# Patient Record
Sex: FEMALE | Race: WHITE | NOT HISPANIC OR LATINO | Employment: UNEMPLOYED | URBAN - METROPOLITAN AREA
[De-identification: names, ages, dates, MRNs, and addresses within clinical notes are randomized per-mention and may not be internally consistent; named-entity substitution may affect disease eponyms.]

---

## 2017-02-02 ENCOUNTER — HOSPITAL ENCOUNTER (OUTPATIENT)
Dept: PAIN MEDICINE | Facility: HOSPITAL | Age: 27
Discharge: HOME | End: 2017-02-02
Attending: ANESTHESIOLOGY | Admitting: ANESTHESIOLOGY

## 2017-05-02 ENCOUNTER — HOSPITAL ENCOUNTER (OUTPATIENT)
Dept: PAIN MEDICINE | Facility: HOSPITAL | Age: 27
Discharge: HOME | End: 2017-05-02
Attending: ANESTHESIOLOGY | Admitting: ANESTHESIOLOGY

## 2017-07-24 ENCOUNTER — HOSPITAL ENCOUNTER (OUTPATIENT)
Dept: PAIN MEDICINE | Facility: HOSPITAL | Age: 27
Discharge: HOME | End: 2017-07-24
Attending: ANESTHESIOLOGY | Admitting: ANESTHESIOLOGY

## 2017-10-19 ENCOUNTER — HOSPITAL ENCOUNTER (OUTPATIENT)
Dept: PAIN MEDICINE | Facility: HOSPITAL | Age: 27
Discharge: HOME | End: 2017-10-19
Attending: ANESTHESIOLOGY | Admitting: ANESTHESIOLOGY

## 2018-01-15 ENCOUNTER — HOSPITAL ENCOUNTER (OUTPATIENT)
Dept: PAIN MEDICINE | Facility: HOSPITAL | Age: 28
Discharge: HOME | End: 2018-01-15
Attending: ANESTHESIOLOGY | Admitting: ANESTHESIOLOGY

## 2018-04-03 ENCOUNTER — HOSPITAL ENCOUNTER (OUTPATIENT)
Dept: PAIN MEDICINE | Facility: HOSPITAL | Age: 28
Discharge: HOME | End: 2018-04-03
Attending: ANESTHESIOLOGY | Admitting: ANESTHESIOLOGY

## 2018-07-17 ENCOUNTER — HOSPITAL ENCOUNTER (OUTPATIENT)
Dept: PAIN MEDICINE | Facility: HOSPITAL | Age: 28
Discharge: HOME | End: 2018-07-17
Attending: ANESTHESIOLOGY | Admitting: ANESTHESIOLOGY

## 2018-11-20 ENCOUNTER — HOSPITAL ENCOUNTER (OUTPATIENT)
Dept: PAIN MEDICINE | Facility: HOSPITAL | Age: 28
Discharge: HOME | End: 2018-11-20
Attending: ANESTHESIOLOGY | Admitting: ANESTHESIOLOGY

## 2019-02-14 ENCOUNTER — HOSPITAL ENCOUNTER (OUTPATIENT)
Dept: PAIN MEDICINE | Facility: HOSPITAL | Age: 29
Discharge: HOME | End: 2019-02-14
Attending: ANESTHESIOLOGY | Admitting: ANESTHESIOLOGY

## 2019-06-05 ENCOUNTER — CONVERSION ENCOUNTER (OUTPATIENT)
Dept: PAIN MEDICINE | Facility: CLINIC | Age: 29
End: 2019-06-05

## 2019-06-05 ENCOUNTER — HOSPITAL ENCOUNTER (OUTPATIENT)
Dept: PAIN MEDICINE | Facility: HOSPITAL | Age: 29
Discharge: HOME | End: 2019-06-05
Attending: PHYSICAL MEDICINE & REHABILITATION | Admitting: PHYSICAL MEDICINE & REHABILITATION

## 2019-06-05 VITALS
DIASTOLIC BLOOD PRESSURE: 83 MMHG | BODY MASS INDEX: 44.15 KG/M2 | HEART RATE: 94 BPM | HEIGHT: 65 IN | WEIGHT: 265 LBS | OXYGEN SATURATION: 97 % | SYSTOLIC BLOOD PRESSURE: 140 MMHG | RESPIRATION RATE: 16 BRPM

## 2019-06-06 NOTE — PROGRESS NOTES
HPI: low back  and neck pain,  Lumbar Back Pain with LLE Pain,  Neck Pain with history of LUE pain,  Lumbar Back Pain is increased with ambulation.  Patient declines Epidural therapy, no benefit with ESIs in past. Taking Oxycodone 10mg TID prn.    Referring MD: Ricardo Aaron MD  History from: patient  Age: 28 Years Old  Sex: Female  Race: White    Pain Assessment   Previous Pain Rating : 8  Current Pain Ratin  Last Dose Pain medicine oxycodone  last dose   5-21  Have your bowel habits changed since you started taking pain medication? No  Epidural History epidurals did not help      Past Medical History:     Reviewed history from 2016 and no changes required:        Neck pain        Back pain        No Drug Allergies?     Past Surgical History:     Reviewed history from 2016 and no changes required:        Tonsillectomy        Leep        C section x 2    Family History Summary:      Reviewed history Last on 2019 and no changes required:2019  Mother - Has Family History of Breast Cancer - Entered On: 2018  Father - Has Family History of Heart Disease - Entered On: 2018      Social History:     Reviewed history from 2018 and no changes required:                 2 children         Vital Signs:    Patient Profile:    28 Years Old Female  Height:     65 inches  Weight:     265 pounds  BMI:        44.09     O2 Sat:     97 %  Temp:       98.8 degrees F  Pulse rate: 94 / minute  Resp:       16 per minute  BP Sittin / 83        Problems: Active problems were reviewed with the patient during this visit.  Medications: Medications were reviewed with the patient during this visit.  Allergies: Allergies were reviewed with the patient during this visit.        Vitals Entered By: Kim Durant (2019 11:51 AM)      Risk Factors:     Smoked Tobacco Use:  Current every day smoker  Smokeless Tobacco Use:  Never     Counseled to quit/cut down:  yes  Alcohol use:   no        Review of Systems        See HPI    General       Denies fever, chills and fatigue.    Eyes       Denies vision loss - both eyes.    ENT       Denies decreased hearing and difficulty swallowing.    CV       Denies chest pain or discomfort.    Resp       Denies shortness of breath.    GI       Complains of nausea.       Denies vomiting, abdominal pain, diarrhea, constipation and stool incontinence.           Denies inability to control bladder.    MS       Complains of back pain and neck pain.       Denies joint pain and joint swelling.    Derm       Denies rash.    Neuro       Complains of headaches.       Denies numbness, weakness and dizziness.      Physical Exam   General  General appearance: well developed, well nourished, no acute distress  Communication ability: communicates by voice, normal quality  Gait and station: antalgic    Mental Status Exam   Mental Status: AAO x3  Thought Content: Intact  Behavior: Appropriate    Head and Face   Inspection: normocephalic, no scars, lesions, or masses  Facial strength: no weakness    Respiratory   Auscultation: clear to auscultation bilaterally, no rales, rhonchi, or wheezes    Cardiovascular   Auscultation: RRR, no murmur, rub, or gallop    Abdomen   Abdomen: soft, non-tender, non-distended, no masses, bowel sounds normal    Extremities   Pedal pulses: pulses 2+, symmetric  Periph. circulation: no cyanosis, clubbing, edema, or varicosities      EXAM:     Neuro   R Leg 2+  L Leg 2+    Strength: Legs Normal  Sensation: SILT in BLE      Total Score Risk Category   Low Risk 0 - 3   Moderate Risk 4 - 7   High Risk > 8     Assessment   Status of Existing Problems:  Assessed CERVICALGIA as unchanged - Dada Oneil MD  Assessed Back pain, lumbar, with radiculopathy as unchanged - Dada Oneil MD    Plan   New Prescriptions/Refills:  OXYCODONE HCL 10 MG ORAL TABLET (OXYCODONE HCL) 1 po Q8H prn pain. Dx M54.12. DNF until 7/5/19.  #90 x 0,  06/05/2019,  Dada Oneil MD  OXYCODONE HCL 10 MG ORAL TABLET (OXYCODONE HCL) 1 po Q8H prn pain. Dx M54.12.  #90 x 0,  06/05/2019, Dada Oneil MD    Updated Medication List:   OXYCODONE HCL 10 MG ORAL TABLET (OXYCODONE HCL) 1 po Q8H prn pain. Dx M54.12. DNF until 7/5/19.  LOSARTAN POTASSIUM 50 MG ORAL TABLET (LOSARTAN POTASSIUM) Take 1 tablet by mouth daily  DUEXIS 800-26.6 MG ORAL TABLET (IBUPROFEN-FAMOTIDINE) 1 po QD  NEURONTIN 600 MG ORAL TABLET (GABAPENTIN) 1 po qhs    New Orders:   Ofc Vst, Est Level III [[id number]]        Patient Instructions:  1)  Inspect reviewed, in order. UDS in order 11/2018.  2)  Cont Oxycodone 10mg TID prn, helps, no side effects.  3)  F/u with Dr. Aaron in 2 months.    ]      Electronically signed by Dada Oneil MD on 06/05/2019 at 12:50 PM  ________________________________________________________________________       Disclaimer: Converted Note message may not contain all data elements that existed in the legacy source system. Please see [email or web address] [facility] for the original note details.

## 2019-08-02 RX ORDER — OXYCODONE HYDROCHLORIDE 10 MG/1
TABLET ORAL
COMMUNITY
Start: 2018-02-08 | End: 2019-08-02 | Stop reason: SDUPTHER

## 2019-08-02 RX ORDER — OXYCODONE HYDROCHLORIDE 10 MG/1
10 TABLET ORAL EVERY 8 HOURS
Qty: 90 TABLET | Refills: 0 | Status: SHIPPED | OUTPATIENT
Start: 2019-08-02 | End: 2019-08-26 | Stop reason: SDUPTHER

## 2019-08-26 ENCOUNTER — OFFICE VISIT (OUTPATIENT)
Dept: PAIN MEDICINE | Facility: CLINIC | Age: 29
End: 2019-08-26

## 2019-08-26 ENCOUNTER — RESULTS ENCOUNTER (OUTPATIENT)
Dept: PAIN MEDICINE | Facility: CLINIC | Age: 29
End: 2019-08-26

## 2019-08-26 VITALS
HEART RATE: 86 BPM | RESPIRATION RATE: 16 BRPM | TEMPERATURE: 98.7 F | HEIGHT: 65 IN | SYSTOLIC BLOOD PRESSURE: 175 MMHG | OXYGEN SATURATION: 97 % | BODY MASS INDEX: 43.32 KG/M2 | DIASTOLIC BLOOD PRESSURE: 95 MMHG | WEIGHT: 260 LBS

## 2019-08-26 DIAGNOSIS — M54.16 LUMBAR RADICULOPATHY: ICD-10-CM

## 2019-08-26 DIAGNOSIS — G89.29 CHRONIC BILATERAL LOW BACK PAIN WITH LEFT-SIDED SCIATICA: Primary | Chronic | ICD-10-CM

## 2019-08-26 DIAGNOSIS — M54.42 CHRONIC BILATERAL LOW BACK PAIN WITH LEFT-SIDED SCIATICA: Primary | Chronic | ICD-10-CM

## 2019-08-26 DIAGNOSIS — Z79.899 LONG TERM USE OF DRUG: ICD-10-CM

## 2019-08-26 DIAGNOSIS — M54.2 NECK PAIN: ICD-10-CM

## 2019-08-26 PROBLEM — M54.50 LOW BACK PAIN: Chronic | Status: ACTIVE | Noted: 2019-08-26

## 2019-08-26 PROCEDURE — 99213 OFFICE O/P EST LOW 20 MIN: CPT | Performed by: PHYSICAL MEDICINE & REHABILITATION

## 2019-08-26 PROCEDURE — G0463 HOSPITAL OUTPT CLINIC VISIT: HCPCS | Performed by: PHYSICAL MEDICINE & REHABILITATION

## 2019-08-26 RX ORDER — GABAPENTIN 600 MG/1
TABLET ORAL
COMMUNITY
Start: 2016-03-10 | End: 2020-02-19

## 2019-08-26 RX ORDER — LOSARTAN POTASSIUM 50 MG/1
50 TABLET ORAL DAILY
Refills: 5 | COMMUNITY
Start: 2019-08-07 | End: 2021-09-02 | Stop reason: ALTCHOICE

## 2019-08-26 RX ORDER — OXYCODONE HYDROCHLORIDE 10 MG/1
10 TABLET ORAL EVERY 8 HOURS
Qty: 90 TABLET | Refills: 0 | Status: SHIPPED | OUTPATIENT
Start: 2019-08-26 | End: 2019-08-26 | Stop reason: SDUPTHER

## 2019-08-26 RX ORDER — OXYCODONE HYDROCHLORIDE 10 MG/1
10 TABLET ORAL EVERY 8 HOURS
Qty: 90 TABLET | Refills: 0 | Status: SHIPPED | OUTPATIENT
Start: 2019-08-26 | End: 2019-09-25

## 2019-08-26 NOTE — PROGRESS NOTES
Chief Complaint   Patient presents with   • Back Pain     oxycodone  last dose  0400 this am   • Neck Pain         Subjective HPI  Judy Cesar is a 29 y.o. female  who presents to the office for follow-up. Patient is new to me.She has Low back pain and neck pain. Lumbar back pain radiates to the LLE down to the foot. She rates her pain at 7/10 today. Pain descriptors include aching, stabbing, sharp, exhausting, nagging, unbearable, shooting, burning, miserable, radiating and deep. Medication, rest, hot/cold compress and TENS unit provide some relief. Walking, sitting, lying down, twisting and lifting make the pain worse. She denies bowel or bladder incontinence. Last UDS 11/2018.    PEG Assessment   What number best describes your pain on average in the past week?6  What number best describes how, during the past week, pain has interfered with your enjoyment of life?8  What number best describes how, during the past week, pain has interfered with your general activity?  7      The following portions of the patient's history were reviewed and updated as appropriate: allergies, current medications, past family history, past medical history, past social history, past surgical history and problem list.    Review of Systems   Constitutional: Negative for appetite change.   Respiratory: Negative for chest tightness and shortness of breath.    Cardiovascular: Negative for chest pain.   Gastrointestinal: Positive for nausea. Negative for abdominal pain, constipation, diarrhea and vomiting.   Genitourinary: Negative for difficulty urinating.   Musculoskeletal: Positive for back pain and neck pain.   Neurological: Negative for dizziness, weakness, numbness and headaches.   Psychiatric/Behavioral: Positive for sleep disturbance. Negative for behavioral problems and decreased concentration.       Vitals:    08/26/19 1105   BP: 175/95   Pulse: 86   Resp: 16   Temp: 98.7 °F (37.1 °C)   SpO2: 97%       Objective   Physical Exam    Constitutional: She is oriented to person, place, and time. She appears well-developed and well-nourished.   HENT:   Head: Normocephalic and atraumatic.   Eyes: EOM are normal.   PER   Neck:   Neck TTP C7. Full ROM with increased pain.   Cardiovascular: Normal rate, regular rhythm and normal heart sounds.   No murmur heard.  Pulmonary/Chest: Effort normal and breath sounds normal. No respiratory distress. She has no wheezes.   Abdominal: Soft. Bowel sounds are normal. There is no tenderness.   Musculoskeletal:   Calves S/NT. No edema BLE.  MUNSON.  Low back TTP S1. Decreased ROM with flexion, extension and S to S bending.   Neurological: She is alert and oriented to person, place, and time. She displays no atrophy. No sensory deficit. She exhibits normal muscle tone.   Reflex Scores:       Tricep reflexes are 1+ on the right side and 1+ on the left side.       Bicep reflexes are 1+ on the right side and 1+ on the left side.       Brachioradialis reflexes are 1+ on the right side and 1+ on the left side.       Patellar reflexes are 2+ on the right side and 2+ on the left side.       Achilles reflexes are 2+ on the right side and 2+ on the left side.  Skin: Skin is warm and dry.   Psychiatric: She has a normal mood and affect. Her behavior is normal.   Vitals reviewed.          Assessment/Plan   Judy was seen today for back pain and neck pain.    Diagnoses and all orders for this visit:    Chronic bilateral low back pain with left-sided sciatica    Long term use of drug  -     Urine Drug Screen - Urine, Clean Catch; Future    Lumbar radiculopathy    Neck pain    Other orders  -     Discontinue: oxyCODONE (ROXICODONE) 10 MG tablet; Take 1 tablet by mouth Every 8 (Eight) Hours for 30 days.  -     oxyCODONE (ROXICODONE) 10 MG tablet; Take 1 tablet by mouth Every 8 (Eight) Hours for 30 days.    UDS  Continue oxycodone  Continue OTC ibuprofen  Continue Gabapentin    Return in about 2 months (around 10/26/2019).          INSPECT REPORT    As part of the patient's treatment plan, I am prescribing controlled substances. The patient has been made aware of appropriate use of such medications, including potential risk of somnolence, limited ability to drive and/or work safely, and the potential for dependence or overdose. It has also bee made clear that these medications are for use by this patient only, without concomitant use of alcohol or other substances unless prescribed.     Patient has completed prescribing agreement detailing terms of continued prescribing of controlled substances, including monitoring INSPECT reports, urine drug screening, and pill counts if necessary. The patient is aware that inappropriate use will results in cessation of prescribing such medications.    INSPECT report has been reviewed and scanned into the patient's chart.    As the clinician, I personally reviewed the INSPECT from 8/26/2019 while the patient was in the office today.    History and physical exam exhibit continued safe and appropriate use of controlled substances.

## 2019-11-06 RX ORDER — OXYCODONE HYDROCHLORIDE 10 MG/1
10 TABLET ORAL EVERY 8 HOURS PRN
Qty: 90 TABLET | Refills: 0 | Status: SHIPPED | OUTPATIENT
Start: 2019-11-06 | End: 2019-11-18 | Stop reason: SDUPTHER

## 2019-11-18 ENCOUNTER — OFFICE VISIT (OUTPATIENT)
Dept: PAIN MEDICINE | Facility: CLINIC | Age: 29
End: 2019-11-18

## 2019-11-18 VITALS
BODY MASS INDEX: 44.15 KG/M2 | HEART RATE: 56 BPM | DIASTOLIC BLOOD PRESSURE: 89 MMHG | SYSTOLIC BLOOD PRESSURE: 149 MMHG | TEMPERATURE: 98.3 F | RESPIRATION RATE: 16 BRPM | WEIGHT: 265 LBS | OXYGEN SATURATION: 100 % | HEIGHT: 65 IN

## 2019-11-18 DIAGNOSIS — M54.42 CHRONIC BILATERAL LOW BACK PAIN WITH LEFT-SIDED SCIATICA: Primary | Chronic | ICD-10-CM

## 2019-11-18 DIAGNOSIS — M54.2 NECK PAIN: ICD-10-CM

## 2019-11-18 DIAGNOSIS — G89.29 CHRONIC BILATERAL LOW BACK PAIN WITH LEFT-SIDED SCIATICA: Primary | Chronic | ICD-10-CM

## 2019-11-18 DIAGNOSIS — M54.16 LUMBAR RADICULOPATHY: ICD-10-CM

## 2019-11-18 PROCEDURE — G0463 HOSPITAL OUTPT CLINIC VISIT: HCPCS | Performed by: PHYSICAL MEDICINE & REHABILITATION

## 2019-11-18 PROCEDURE — 99214 OFFICE O/P EST MOD 30 MIN: CPT | Performed by: PHYSICAL MEDICINE & REHABILITATION

## 2019-11-18 RX ORDER — OXYCODONE HYDROCHLORIDE 10 MG/1
10 TABLET ORAL EVERY 8 HOURS PRN
Qty: 90 TABLET | Refills: 0 | Status: SHIPPED | OUTPATIENT
Start: 2019-11-18 | End: 2019-11-18 | Stop reason: SDUPTHER

## 2019-11-18 RX ORDER — OXYCODONE HYDROCHLORIDE 10 MG/1
10 TABLET ORAL EVERY 8 HOURS PRN
Qty: 90 TABLET | Refills: 0 | Status: SHIPPED | OUTPATIENT
Start: 2019-11-18 | End: 2020-02-19 | Stop reason: SDUPTHER

## 2019-11-18 NOTE — PROGRESS NOTES
Chief Complaint   Patient presents with   • Back Pain     oxycodone  last dose   0600 this am   • Neck Pain   • Shoulder Pain     left         Subjective   Judy Cesar is a 29 y.o. female  who presents to the office for follow-up.She has chronic low back pain with intermittent pain RLE as well as neck pain. She rates her pain at 6/10 today. Pain desriptors include aching, stabbing, sharp, nagging, unbearable at times, shooting, burning, radiating and deep. Medication, heat, cold compress and TENS provide some temporary relief. Walking, bending, standing, sitting too long, lifting and pulling make the pain worse. MRI C-Spine, T-Spine and L-Spine reports reviewed. Denies bowel or bladder incontinence. Last UDS 8/26/2019.      MRI C-spine 11/12/2015       MRI T-Spine11/12/2015    MRI L-Spine 11/12/2015      PEG Assessment   What number best describes your pain on average in the past week?6  What number best describes how, during the past week, pain has interfered with your enjoyment of life?6  What number best describes how, during the past week, pain has interfered with your general activity?  5      The following portions of the patient's history were reviewed and updated as appropriate: allergies, current medications, past family history, past medical history, past social history, past surgical history and problem list.    Review of Systems   Constitutional: Negative for appetite change.        Tiredness   Respiratory: Negative for chest tightness and shortness of breath.    Cardiovascular: Negative for chest pain.   Gastrointestinal: Negative for abdominal pain, constipation, diarrhea, nausea and vomiting.   Genitourinary: Negative for difficulty urinating.   Musculoskeletal: Positive for back pain and neck pain.        Intermittent LLE pain   Neurological: Negative for dizziness, weakness, numbness and headaches.   Psychiatric/Behavioral: Positive for sleep disturbance.       Vitals:    11/18/19 1026   BP:  149/89   Pulse: 56   Resp: 16   Temp: 98.3 °F (36.8 °C)   SpO2: 100%       Objective   Physical Exam   Constitutional: She is oriented to person, place, and time. She appears well-developed and well-nourished. No distress.   HENT:   Head: Normocephalic and atraumatic.   Eyes: EOM are normal.   PER   Neck:   Neck TTP Left C7 and upper trapezius. Decreased AROM with flexion, extension, S to S bending and rotation.   Cardiovascular: Normal rate, regular rhythm and normal heart sounds.   Pulmonary/Chest: Effort normal and breath sounds normal. No stridor. She has no wheezes.   Abdominal: Soft. Bowel sounds are normal. There is no tenderness.   Musculoskeletal:   Back TTP L4-S1. Decreased AROM with flexion, extension and S to S bending.   Neurological: She is alert and oriented to person, place, and time. She has normal strength. She displays no atrophy. No sensory deficit.   Reflex Scores:       Tricep reflexes are 2+ on the right side and 2+ on the left side.       Bicep reflexes are 2+ on the right side and 2+ on the left side.       Brachioradialis reflexes are 2+ on the right side and 2+ on the left side.       Patellar reflexes are 2+ on the right side and 2+ on the left side.       Achilles reflexes are 2+ on the right side and 2+ on the left side.  Skin: Skin is warm and dry.   Psychiatric: She has a normal mood and affect. Her behavior is normal.   Vitals reviewed.          Assessment/Plan   Judy was seen today for back pain, neck pain and shoulder pain.    Diagnoses and all orders for this visit:    Chronic bilateral low back pain with left-sided sciatica    Lumbar radiculopathy    Neck pain    Other orders  -     Discontinue: oxyCODONE (ROXICODONE) 10 MG tablet; Take 1 tablet by mouth Every 8 (Eight) Hours As Needed for Moderate Pain .  -     Discontinue: oxyCODONE (ROXICODONE) 10 MG tablet; Take 1 tablet by mouth Every 8 (Eight) Hours As Needed for Moderate Pain  or Severe Pain .  -     oxyCODONE  (ROXICODONE) 10 MG tablet; Take 1 tablet by mouth Every 8 (Eight) Hours As Needed for Moderate Pain  or Severe Pain .    Continue oxycodone and gabapentin.  Also takes OTC ibuprofen prn.    Return in about 3 months (around 2/18/2020) for Pain Mgmt.           INSPECT REPORT    As part of the patient's treatment plan, I am prescribing controlled substances. The patient has been made aware of appropriate use of such medications, including potential risk of somnolence, limited ability to drive and/or work safely, and the potential for dependence or overdose. It has also bee made clear that these medications are for use by this patient only, without concomitant use of alcohol or other substances unless prescribed.     Patient has completed prescribing agreement detailing terms of continued prescribing of controlled substances, including monitoring INSPECT reports, urine drug screening, and pill counts if necessary. The patient is aware that inappropriate use will results in cessation of prescribing such medications.    INSPECT report has been reviewed and scanned into the patient's chart.    As the clinician, I personally reviewed the INSPECT from 11/18/2019 while the patient was in the office today.    History and physical exam exhibit continued safe and appropriate use of controlled substances.

## 2020-02-19 ENCOUNTER — OFFICE VISIT (OUTPATIENT)
Dept: PAIN MEDICINE | Facility: CLINIC | Age: 30
End: 2020-02-19

## 2020-02-19 VITALS
SYSTOLIC BLOOD PRESSURE: 153 MMHG | OXYGEN SATURATION: 99 % | HEIGHT: 65 IN | HEART RATE: 77 BPM | WEIGHT: 268 LBS | DIASTOLIC BLOOD PRESSURE: 99 MMHG | TEMPERATURE: 97.7 F | BODY MASS INDEX: 44.65 KG/M2 | RESPIRATION RATE: 16 BRPM

## 2020-02-19 DIAGNOSIS — Z79.899 OTHER LONG TERM (CURRENT) DRUG THERAPY: ICD-10-CM

## 2020-02-19 DIAGNOSIS — M54.42 CHRONIC BILATERAL LOW BACK PAIN WITH LEFT-SIDED SCIATICA: Primary | Chronic | ICD-10-CM

## 2020-02-19 DIAGNOSIS — M54.2 NECK PAIN: ICD-10-CM

## 2020-02-19 DIAGNOSIS — G89.29 CHRONIC BILATERAL LOW BACK PAIN WITH LEFT-SIDED SCIATICA: Primary | Chronic | ICD-10-CM

## 2020-02-19 DIAGNOSIS — M54.12 CERVICAL RADICULOPATHY: ICD-10-CM

## 2020-02-19 DIAGNOSIS — M54.16 LUMBAR RADICULOPATHY: ICD-10-CM

## 2020-02-19 PROCEDURE — 99213 OFFICE O/P EST LOW 20 MIN: CPT | Performed by: PHYSICAL MEDICINE & REHABILITATION

## 2020-02-19 PROCEDURE — G0463 HOSPITAL OUTPT CLINIC VISIT: HCPCS | Performed by: PHYSICAL MEDICINE & REHABILITATION

## 2020-02-19 RX ORDER — OXYCODONE HYDROCHLORIDE 10 MG/1
10 TABLET ORAL EVERY 8 HOURS PRN
Qty: 90 TABLET | Refills: 0 | Status: SHIPPED | OUTPATIENT
Start: 2020-02-19 | End: 2020-02-19 | Stop reason: SDUPTHER

## 2020-02-19 RX ORDER — OXYCODONE HYDROCHLORIDE 10 MG/1
10 TABLET ORAL EVERY 8 HOURS PRN
Qty: 90 TABLET | Refills: 0 | Status: SHIPPED | OUTPATIENT
Start: 2020-02-19 | End: 2020-04-29 | Stop reason: SDUPTHER

## 2020-02-19 NOTE — PROGRESS NOTES
Subjective   Judy Cesar is a 29 y.o. female.     chronic low back pain with intermittent pain RLE as well as neck pain. She rates her pain at 7/10 today. Pain desriptors include aching, stabbing, sharp, nagging, unbearable at times, shooting, burning, radiating and deep. Medication, heat, cold compress and TENS provide some temporary relief. Walking, bending, standing, sitting too long, lifting and pulling make the pain worse. MRI C-Spine, T-Spine and L-Spine reports reviewed, has DDD of C-spine and L-spine with left S1 nerve root impingement. Denies bowel or bladder incontinence.       The following portions of the patient's history were reviewed and updated as appropriate: allergies, current medications, past family history, past medical history, past social history, past surgical history and problem list.    Review of Systems   Constitutional: Negative for chills, fatigue and fever.   HENT: Negative for hearing loss and trouble swallowing.    Eyes: Negative for visual disturbance.   Respiratory: Negative for shortness of breath.    Cardiovascular: Negative for chest pain.   Gastrointestinal: Negative for abdominal pain, constipation, diarrhea, nausea and vomiting.   Genitourinary: Negative for urinary incontinence.   Musculoskeletal: Positive for back pain and neck pain. Negative for arthralgias, joint swelling and myalgias.   Neurological: Negative for dizziness, weakness, numbness and headache.   Psychiatric/Behavioral: Positive for sleep disturbance.       Objective   Physical Exam   Constitutional: She is oriented to person, place, and time. She appears well-developed and well-nourished.   HENT:   Head: Normocephalic and atraumatic.   Eyes: Pupils are equal, round, and reactive to light. EOM are normal.   Neck:   Decreased AROM   Cardiovascular: Normal rate, regular rhythm, normal heart sounds and intact distal pulses.   Pulmonary/Chest: Breath sounds normal.   Abdominal: Soft. Bowel sounds are normal.  She exhibits no distension. There is no tenderness.   Neurological: She is alert and oriented to person, place, and time. She has normal strength and normal reflexes. She displays normal reflexes. No sensory deficit.   Psychiatric: She has a normal mood and affect. Her behavior is normal. Thought content normal.         Assessment/Plan   Judy was seen today for back pain and neck pain.    Diagnoses and all orders for this visit:    Chronic bilateral low back pain with left-sided sciatica  -     Discontinue: oxyCODONE (ROXICODONE) 10 MG tablet; Take 1 tablet by mouth Every 8 (Eight) Hours As Needed for Moderate Pain  or Severe Pain .  -     oxyCODONE (ROXICODONE) 10 MG tablet; Take 1 tablet by mouth Every 8 (Eight) Hours As Needed for Moderate Pain  or Severe Pain .    Lumbar radiculopathy    Neck pain    Other long term (current) drug therapy    Cervical radiculopathy        Inspect reviewed, in order. Last UDS 8/26/2019.  Continue oxycodone 10mg q8h prn, denies side effects.  Could not tolerate Gabapentin with severe somnolence.  Also takes OTC ibuprofen prn.  RTC 3 months for f/u.

## 2020-04-29 ENCOUNTER — OFFICE VISIT (OUTPATIENT)
Dept: PAIN MEDICINE | Facility: CLINIC | Age: 30
End: 2020-04-29

## 2020-04-29 VITALS — HEIGHT: 65 IN | WEIGHT: 260 LBS | BODY MASS INDEX: 43.32 KG/M2

## 2020-04-29 DIAGNOSIS — M54.16 LUMBAR RADICULOPATHY: ICD-10-CM

## 2020-04-29 DIAGNOSIS — M54.2 NECK PAIN: ICD-10-CM

## 2020-04-29 DIAGNOSIS — M54.42 CHRONIC BILATERAL LOW BACK PAIN WITH LEFT-SIDED SCIATICA: Chronic | ICD-10-CM

## 2020-04-29 DIAGNOSIS — Z79.899 OTHER LONG TERM (CURRENT) DRUG THERAPY: ICD-10-CM

## 2020-04-29 DIAGNOSIS — G89.29 CHRONIC BILATERAL LOW BACK PAIN WITH LEFT-SIDED SCIATICA: Chronic | ICD-10-CM

## 2020-04-29 DIAGNOSIS — M54.12 CERVICAL RADICULOPATHY: Primary | ICD-10-CM

## 2020-04-29 PROCEDURE — 99441 PR PHYS/QHP TELEPHONE EVALUATION 5-10 MIN: CPT | Performed by: PHYSICAL MEDICINE & REHABILITATION

## 2020-04-29 RX ORDER — OXYCODONE HYDROCHLORIDE 10 MG/1
10 TABLET ORAL EVERY 8 HOURS PRN
Qty: 90 TABLET | Refills: 0 | Status: SHIPPED | OUTPATIENT
Start: 2020-04-29 | End: 2020-06-15 | Stop reason: SDUPTHER

## 2020-04-29 NOTE — PROGRESS NOTES
Subjective   Judy Cesar is a 29 y.o. female.     chronic low back pain with intermittent pain RLE as well as neck pain. She rates her pain at 7/10 today. Pain desriptors include aching, stabbing, sharp, nagging, unbearable at times, shooting, burning, radiating and deep. Medication, heat, cold compress and TENS provide some temporary relief. Walking, bending, standing, sitting too long, lifting and pulling make the pain worse. MRI C-Spine, T-Spine and L-Spine reports reviewed, has DDD of C-spine and L-spine with left S1 nerve root impingement. Denies bowel or bladder incontinence.       The following portions of the patient's history were reviewed and updated as appropriate: allergies, current medications, past family history, past medical history, past social history, past surgical history and problem list.    Review of Systems   Constitutional: Negative for chills, fatigue and fever.   HENT: Negative for hearing loss and trouble swallowing.    Eyes: Negative for visual disturbance.   Respiratory: Negative for shortness of breath.    Cardiovascular: Negative for chest pain.   Gastrointestinal: Negative for abdominal pain, constipation, diarrhea, nausea and vomiting.   Genitourinary: Negative for urinary incontinence.   Musculoskeletal: Positive for back pain and neck pain. Negative for arthralgias, joint swelling and myalgias.   Neurological: Negative for dizziness, weakness, numbness and headache.   Psychiatric/Behavioral: Positive for sleep disturbance.       Objective   Physical Exam   Constitutional: She is oriented to person, place, and time.   Neck:   Decreased AROM   Neurological: She is alert and oriented to person, place, and time. She has normal strength and normal reflexes.   Psychiatric: She has a normal mood and affect. Her behavior is normal. Thought content normal.         Assessment/Plan   Judy was seen today for back pain and neck pain.    Diagnoses and all orders for this visit:    Cervical  radiculopathy    Chronic bilateral low back pain with left-sided sciatica    Lumbar radiculopathy    Neck pain    Other long term (current) drug therapy        Inspect reviewed, in order, filled 4/17/20. Last UDS 8/26/2019.  Continue oxycodone 10mg q8h prn, denies side effects.  Could not tolerate Gabapentin with severe somnolence.  Also takes OTC ibuprofen prn.  RTC 3 months for f/u. Telephone visit, 5 minutes discussing her plan of care and medications.

## 2020-05-07 ENCOUNTER — TELEPHONE (OUTPATIENT)
Dept: PAIN MEDICINE | Facility: CLINIC | Age: 30
End: 2020-05-07

## 2020-05-07 NOTE — TELEPHONE ENCOUNTER
Patient called and reported she is having dental surgery this morning. Wanted to make sure that if dentist gives something she put the meds she gets here aside. I called patient back and lvm that if dentist gives something she can put aside the meds we give her until she is finished taking post op meds from dentist.

## 2020-06-15 DIAGNOSIS — M54.42 CHRONIC BILATERAL LOW BACK PAIN WITH LEFT-SIDED SCIATICA: Chronic | ICD-10-CM

## 2020-06-15 DIAGNOSIS — G89.29 CHRONIC BILATERAL LOW BACK PAIN WITH LEFT-SIDED SCIATICA: Chronic | ICD-10-CM

## 2020-06-15 RX ORDER — OXYCODONE HYDROCHLORIDE 10 MG/1
10 TABLET ORAL EVERY 8 HOURS PRN
Qty: 90 TABLET | Refills: 0 | Status: SHIPPED | OUTPATIENT
Start: 2020-06-15 | End: 2020-07-13 | Stop reason: SDUPTHER

## 2020-07-13 DIAGNOSIS — M54.42 CHRONIC BILATERAL LOW BACK PAIN WITH LEFT-SIDED SCIATICA: Chronic | ICD-10-CM

## 2020-07-13 DIAGNOSIS — G89.29 CHRONIC BILATERAL LOW BACK PAIN WITH LEFT-SIDED SCIATICA: Chronic | ICD-10-CM

## 2020-07-13 RX ORDER — OXYCODONE HYDROCHLORIDE 10 MG/1
10 TABLET ORAL EVERY 8 HOURS PRN
Qty: 90 TABLET | Refills: 0 | Status: SHIPPED | OUTPATIENT
Start: 2020-07-13 | End: 2020-07-27 | Stop reason: SDUPTHER

## 2020-07-27 ENCOUNTER — RESULTS ENCOUNTER (OUTPATIENT)
Dept: PAIN MEDICINE | Facility: CLINIC | Age: 30
End: 2020-07-27

## 2020-07-27 ENCOUNTER — OFFICE VISIT (OUTPATIENT)
Dept: PAIN MEDICINE | Facility: CLINIC | Age: 30
End: 2020-07-27

## 2020-07-27 VITALS
HEART RATE: 78 BPM | RESPIRATION RATE: 16 BRPM | WEIGHT: 265 LBS | TEMPERATURE: 97.5 F | DIASTOLIC BLOOD PRESSURE: 82 MMHG | BODY MASS INDEX: 44.15 KG/M2 | HEIGHT: 65 IN | OXYGEN SATURATION: 96 % | SYSTOLIC BLOOD PRESSURE: 145 MMHG

## 2020-07-27 DIAGNOSIS — G89.29 CHRONIC BILATERAL LOW BACK PAIN WITH LEFT-SIDED SCIATICA: Primary | Chronic | ICD-10-CM

## 2020-07-27 DIAGNOSIS — M54.2 NECK PAIN: ICD-10-CM

## 2020-07-27 DIAGNOSIS — M54.42 CHRONIC BILATERAL LOW BACK PAIN WITH LEFT-SIDED SCIATICA: Primary | Chronic | ICD-10-CM

## 2020-07-27 DIAGNOSIS — Z79.899 OTHER LONG TERM (CURRENT) DRUG THERAPY: ICD-10-CM

## 2020-07-27 DIAGNOSIS — M54.16 LUMBAR RADICULOPATHY: ICD-10-CM

## 2020-07-27 DIAGNOSIS — M54.12 CERVICAL RADICULOPATHY: ICD-10-CM

## 2020-07-27 PROCEDURE — G0463 HOSPITAL OUTPT CLINIC VISIT: HCPCS | Performed by: PHYSICAL MEDICINE & REHABILITATION

## 2020-07-27 PROCEDURE — 99213 OFFICE O/P EST LOW 20 MIN: CPT | Performed by: PHYSICAL MEDICINE & REHABILITATION

## 2020-07-27 RX ORDER — PRAVASTATIN SODIUM 40 MG
40 TABLET ORAL DAILY
COMMUNITY
Start: 2020-07-12 | End: 2021-09-02

## 2020-07-27 RX ORDER — OXYCODONE HYDROCHLORIDE 10 MG/1
10 TABLET ORAL EVERY 8 HOURS PRN
Qty: 90 TABLET | Refills: 0 | Status: SHIPPED | OUTPATIENT
Start: 2020-07-27 | End: 2020-07-27 | Stop reason: SDUPTHER

## 2020-07-27 RX ORDER — OXYCODONE HYDROCHLORIDE 10 MG/1
10 TABLET ORAL EVERY 8 HOURS PRN
Qty: 90 TABLET | Refills: 0 | Status: SHIPPED | OUTPATIENT
Start: 2020-07-27 | End: 2020-10-26 | Stop reason: SDUPTHER

## 2020-07-27 NOTE — PROGRESS NOTES
Subjective   Judy Cesar is a 30 y.o. female.     chronic low back pain with intermittent pain RLE as well as neck pain. She rates her pain at 7/10 today. Pain desriptors include aching, stabbing, sharp, nagging, unbearable at times, shooting, burning, radiating and deep. Medication, heat, cold compress and TENS provide some temporary relief. Walking, bending, standing, sitting too long, lifting and pulling make the pain worse. MRI C-Spine, T-Spine and L-Spine reports reviewed, has DDD of C-spine and L-spine with left S1 nerve root impingement. Denies bowel or bladder incontinence.       The following portions of the patient's history were reviewed and updated as appropriate: allergies, current medications, past family history, past medical history, past social history, past surgical history and problem list.    Review of Systems   Constitutional: Negative for chills, fatigue and fever.   HENT: Negative for hearing loss and trouble swallowing.    Eyes: Negative for visual disturbance.   Respiratory: Negative for shortness of breath.    Cardiovascular: Negative for chest pain.   Gastrointestinal: Negative for abdominal pain, constipation, diarrhea, nausea and vomiting.   Genitourinary: Negative for urinary incontinence.   Musculoskeletal: Positive for back pain and neck pain. Negative for arthralgias, joint swelling and myalgias.   Neurological: Negative for dizziness, weakness, numbness and headache.   Psychiatric/Behavioral: Positive for sleep disturbance.       Objective   Physical Exam   Constitutional: She is oriented to person, place, and time. She appears well-developed and well-nourished.   HENT:   Head: Normocephalic and atraumatic.   Eyes: Pupils are equal, round, and reactive to light. EOM are normal.   Neck:   Decreased AROM   Cardiovascular: Normal rate, regular rhythm, normal heart sounds and intact distal pulses.   Pulmonary/Chest: Breath sounds normal.   Abdominal: Soft. Bowel sounds are normal.  She exhibits no distension. There is no tenderness.   Neurological: She is alert and oriented to person, place, and time. She has normal strength and normal reflexes. She displays normal reflexes. No sensory deficit.   Psychiatric: She has a normal mood and affect. Her behavior is normal. Thought content normal.         Assessment/Plan   Judy was seen today for back pain and neck pain.    Diagnoses and all orders for this visit:    Chronic bilateral low back pain with left-sided sciatica    Lumbar radiculopathy    Neck pain    Cervical radiculopathy    Other long term (current) drug therapy        Inspect reviewed, in order. Last UDS 8/26/2019.  Continue oxycodone 10mg q8h prn, denies side effects.  Could not tolerate Gabapentin with severe somnolence.  Also takes OTC ibuprofen prn.  RTC 3 months for f/u.

## 2020-10-26 ENCOUNTER — OFFICE VISIT (OUTPATIENT)
Dept: PAIN MEDICINE | Facility: CLINIC | Age: 30
End: 2020-10-26

## 2020-10-26 VITALS
HEART RATE: 90 BPM | DIASTOLIC BLOOD PRESSURE: 94 MMHG | WEIGHT: 256 LBS | HEIGHT: 65 IN | SYSTOLIC BLOOD PRESSURE: 156 MMHG | BODY MASS INDEX: 42.65 KG/M2 | OXYGEN SATURATION: 97 % | RESPIRATION RATE: 16 BRPM | TEMPERATURE: 97.5 F

## 2020-10-26 DIAGNOSIS — M54.42 CHRONIC BILATERAL LOW BACK PAIN WITH LEFT-SIDED SCIATICA: Primary | Chronic | ICD-10-CM

## 2020-10-26 DIAGNOSIS — M54.12 CERVICAL RADICULOPATHY: ICD-10-CM

## 2020-10-26 DIAGNOSIS — Z79.899 OTHER LONG TERM (CURRENT) DRUG THERAPY: ICD-10-CM

## 2020-10-26 DIAGNOSIS — M54.16 LUMBAR RADICULOPATHY: ICD-10-CM

## 2020-10-26 DIAGNOSIS — G89.29 CHRONIC BILATERAL LOW BACK PAIN WITH LEFT-SIDED SCIATICA: Primary | Chronic | ICD-10-CM

## 2020-10-26 DIAGNOSIS — M54.2 NECK PAIN: ICD-10-CM

## 2020-10-26 PROCEDURE — G0463 HOSPITAL OUTPT CLINIC VISIT: HCPCS | Performed by: PHYSICAL MEDICINE & REHABILITATION

## 2020-10-26 PROCEDURE — 99213 OFFICE O/P EST LOW 20 MIN: CPT | Performed by: PHYSICAL MEDICINE & REHABILITATION

## 2020-10-26 RX ORDER — OXYCODONE HYDROCHLORIDE 10 MG/1
10 TABLET ORAL EVERY 8 HOURS PRN
Qty: 90 TABLET | Refills: 0 | Status: SHIPPED | OUTPATIENT
Start: 2020-10-26 | End: 2020-10-26 | Stop reason: SDUPTHER

## 2020-10-26 RX ORDER — OXYCODONE HYDROCHLORIDE 10 MG/1
10 TABLET ORAL EVERY 8 HOURS PRN
Qty: 90 TABLET | Refills: 0 | Status: SHIPPED | OUTPATIENT
Start: 2020-10-26 | End: 2021-02-08 | Stop reason: SDUPTHER

## 2020-10-26 NOTE — PROGRESS NOTES
Subjective   Judy Cesar is a 30 y.o. female.     chronic low back pain with intermittent pain RLE as well as neck pain. She rates her pain at 7/10 today. Pain desriptors include aching, stabbing, sharp, nagging, unbearable at times, shooting, burning, radiating and deep. Medication, heat, cold compress and TENS provide some temporary relief. Walking, bending, standing, sitting too long, lifting and pulling make the pain worse. MRI C-Spine, T-Spine and L-Spine reports reviewed, has DDD of C-spine and L-spine with left S1 nerve root impingement. Denies bowel or bladder incontinence. Beginning to drop objects with L hand occasionally, has tingling when she wakes up, denies vision changes or urinary incontinence.       The following portions of the patient's history were reviewed and updated as appropriate: allergies, current medications, past family history, past medical history, past social history, past surgical history and problem list.    Review of Systems   Constitutional: Negative for chills, fatigue and fever.   HENT: Negative for hearing loss and trouble swallowing.    Eyes: Negative for visual disturbance.   Respiratory: Negative for shortness of breath.    Cardiovascular: Negative for chest pain.   Gastrointestinal: Negative for abdominal pain, constipation, diarrhea, nausea and vomiting.   Genitourinary: Negative for urinary incontinence.   Musculoskeletal: Positive for back pain and neck pain. Negative for arthralgias, joint swelling and myalgias.   Neurological: Negative for dizziness, weakness, numbness and headache.   Psychiatric/Behavioral: Positive for sleep disturbance.       Objective   Physical Exam   Constitutional: She is oriented to person, place, and time. She appears well-developed and well-nourished.   HENT:   Head: Normocephalic and atraumatic.   Eyes: Pupils are equal, round, and reactive to light.   Neck:   Decreased AROM   Cardiovascular: Normal rate, regular rhythm and normal heart  sounds.   Pulmonary/Chest: Breath sounds normal.   Abdominal: Soft. Bowel sounds are normal. She exhibits no distension. There is no abdominal tenderness.   Neurological: She is alert and oriented to person, place, and time. She has normal reflexes. She displays normal reflexes. No sensory deficit.   (-) Tinels, (+) Phalens on left   Psychiatric: Her behavior is normal. Thought content normal.         Assessment/Plan   Diagnoses and all orders for this visit:    1. Chronic bilateral low back pain with left-sided sciatica (Primary)    2. Lumbar radiculopathy    3. Neck pain    4. Cervical radiculopathy    5. Other long term (current) drug therapy        Inspect reviewed, in order. Last UDS 7/27/20.  Continue oxycodone 10mg q8h prn, denies side effects.  Could not tolerate Gabapentin with severe somnolence.  Also takes OTC ibuprofen prn.  Discussed wearing neutral wrist brace on left at night for probable early CTS.  RTC 3 months for f/u.

## 2021-02-08 ENCOUNTER — OFFICE VISIT (OUTPATIENT)
Dept: PAIN MEDICINE | Facility: CLINIC | Age: 31
End: 2021-02-08

## 2021-02-08 VITALS
BODY MASS INDEX: 42.65 KG/M2 | OXYGEN SATURATION: 96 % | WEIGHT: 256 LBS | HEART RATE: 98 BPM | HEIGHT: 65 IN | RESPIRATION RATE: 16 BRPM | SYSTOLIC BLOOD PRESSURE: 157 MMHG | TEMPERATURE: 97.5 F | DIASTOLIC BLOOD PRESSURE: 95 MMHG

## 2021-02-08 DIAGNOSIS — M54.12 CERVICAL RADICULOPATHY: ICD-10-CM

## 2021-02-08 DIAGNOSIS — M54.16 LUMBAR RADICULOPATHY: ICD-10-CM

## 2021-02-08 DIAGNOSIS — Z79.899 OTHER LONG TERM (CURRENT) DRUG THERAPY: ICD-10-CM

## 2021-02-08 DIAGNOSIS — M54.42 CHRONIC BILATERAL LOW BACK PAIN WITH LEFT-SIDED SCIATICA: Primary | Chronic | ICD-10-CM

## 2021-02-08 DIAGNOSIS — M54.2 NECK PAIN: ICD-10-CM

## 2021-02-08 DIAGNOSIS — G89.29 CHRONIC BILATERAL LOW BACK PAIN WITH LEFT-SIDED SCIATICA: Primary | Chronic | ICD-10-CM

## 2021-02-08 PROCEDURE — 99213 OFFICE O/P EST LOW 20 MIN: CPT | Performed by: PHYSICAL MEDICINE & REHABILITATION

## 2021-02-08 PROCEDURE — G0463 HOSPITAL OUTPT CLINIC VISIT: HCPCS | Performed by: PHYSICAL MEDICINE & REHABILITATION

## 2021-02-08 RX ORDER — OXYCODONE HYDROCHLORIDE 10 MG/1
10 TABLET ORAL EVERY 8 HOURS PRN
Qty: 90 TABLET | Refills: 0 | Status: SHIPPED | OUTPATIENT
Start: 2021-02-08 | End: 2021-05-03 | Stop reason: SDUPTHER

## 2021-02-08 NOTE — PROGRESS NOTES
Subjective   Judy Cesar is a 30 y.o. female.     chronic low back pain with intermittent pain RLE as well as neck pain. She rates her pain at 7/10 today. Pain desriptors include aching, stabbing, sharp, nagging, unbearable at times, shooting, burning, radiating and deep. Medication, heat, cold compress and TENS provide some temporary relief. Walking, bending, standing, sitting too long, lifting and pulling make the pain worse. MRI C-Spine, T-Spine and L-Spine reports reviewed, has DDD of C-spine and L-spine with left S1 nerve root impingement. Denies bowel or bladder incontinence. Beginning to drop objects with L hand occasionally, has tingling when she wakes up, denies vision changes or urinary incontinence.       The following portions of the patient's history were reviewed and updated as appropriate: allergies, current medications, past family history, past medical history, past social history, past surgical history and problem list.    Review of Systems   Constitutional: Negative for chills, fatigue and fever.   HENT: Negative for hearing loss and trouble swallowing.    Eyes: Negative for visual disturbance.   Respiratory: Negative for shortness of breath.    Cardiovascular: Negative for chest pain.   Gastrointestinal: Negative for abdominal pain, constipation, diarrhea, nausea and vomiting.   Genitourinary: Negative for urinary incontinence.   Musculoskeletal: Positive for back pain and neck pain. Negative for arthralgias, joint swelling and myalgias.   Neurological: Negative for dizziness, weakness, numbness and headache.   Psychiatric/Behavioral: Positive for sleep disturbance.       Objective   Physical Exam   Constitutional: She is oriented to person, place, and time. She appears well-developed and well-nourished.   HENT:   Head: Normocephalic and atraumatic.   Eyes: Pupils are equal, round, and reactive to light.   Neck:   Decreased AROM   Cardiovascular: Normal rate, regular rhythm and normal heart  sounds.   Pulmonary/Chest: Breath sounds normal.   Abdominal: Soft. Bowel sounds are normal. She exhibits no distension. There is no abdominal tenderness.   Neurological: She is alert and oriented to person, place, and time. She has normal reflexes. She displays normal reflexes. No sensory deficit.   (-) Tinels, (+) Phalens on left   Psychiatric: Her behavior is normal. Thought content normal.         Assessment/Plan   Diagnoses and all orders for this visit:    1. Chronic bilateral low back pain with left-sided sciatica (Primary)    2. Neck pain    3. Other long term (current) drug therapy    4. Lumbar radiculopathy    5. Cervical radiculopathy        Inspect reviewed, in order. Last UDS 7/27/20.  Continue oxycodone 10mg q8h prn, denies side effects.  Could not tolerate Gabapentin with severe somnolence.  Also takes OTC ibuprofen prn.  Discussed wearing neutral wrist brace on left at night for probable early CTS.  RTC 3 months for f/u.

## 2021-05-03 ENCOUNTER — OFFICE VISIT (OUTPATIENT)
Dept: PAIN MEDICINE | Facility: CLINIC | Age: 31
End: 2021-05-03

## 2021-05-03 VITALS
OXYGEN SATURATION: 97 % | HEIGHT: 65 IN | SYSTOLIC BLOOD PRESSURE: 146 MMHG | RESPIRATION RATE: 16 BRPM | DIASTOLIC BLOOD PRESSURE: 83 MMHG | WEIGHT: 269 LBS | HEART RATE: 74 BPM | TEMPERATURE: 97.3 F | BODY MASS INDEX: 44.82 KG/M2

## 2021-05-03 DIAGNOSIS — G89.29 CHRONIC BILATERAL LOW BACK PAIN WITH LEFT-SIDED SCIATICA: Primary | Chronic | ICD-10-CM

## 2021-05-03 DIAGNOSIS — M54.42 CHRONIC BILATERAL LOW BACK PAIN WITH LEFT-SIDED SCIATICA: Primary | Chronic | ICD-10-CM

## 2021-05-03 DIAGNOSIS — M54.16 LUMBAR RADICULOPATHY: ICD-10-CM

## 2021-05-03 DIAGNOSIS — M54.12 CERVICAL RADICULOPATHY: ICD-10-CM

## 2021-05-03 DIAGNOSIS — M54.2 NECK PAIN: ICD-10-CM

## 2021-05-03 DIAGNOSIS — Z79.899 OTHER LONG TERM (CURRENT) DRUG THERAPY: ICD-10-CM

## 2021-05-03 PROCEDURE — 99213 OFFICE O/P EST LOW 20 MIN: CPT | Performed by: PHYSICAL MEDICINE & REHABILITATION

## 2021-05-03 PROCEDURE — G0463 HOSPITAL OUTPT CLINIC VISIT: HCPCS | Performed by: PHYSICAL MEDICINE & REHABILITATION

## 2021-05-03 RX ORDER — OXYCODONE HYDROCHLORIDE 10 MG/1
10 TABLET ORAL EVERY 8 HOURS PRN
Qty: 90 TABLET | Refills: 0 | Status: SHIPPED | OUTPATIENT
Start: 2021-05-03 | End: 2021-09-02

## 2021-05-03 NOTE — PROGRESS NOTES
Subjective   Judy Cesar is a 30 y.o. female.     chronic low back pain with intermittent pain RLE as well as neck pain. She rates her pain at 7/10 today. Pain desriptors include aching, stabbing, sharp, nagging, unbearable at times, shooting, burning, radiating and deep. Medication, heat, cold compress and TENS provide some temporary relief. Walking, bending, standing, sitting too long, lifting and pulling make the pain worse. MRI C-Spine, T-Spine and L-Spine reports reviewed, has DDD of C-spine and L-spine with left S1 nerve root impingement. Denies bowel or bladder incontinence. Beginning to drop objects with L hand occasionally, has tingling when she wakes up, denies vision changes or urinary incontinence.       The following portions of the patient's history were reviewed and updated as appropriate: allergies, current medications, past family history, past medical history, past social history, past surgical history and problem list.    Review of Systems   Constitutional: Negative for chills, fatigue and fever.   HENT: Negative for hearing loss and trouble swallowing.    Eyes: Negative for visual disturbance.   Respiratory: Negative for shortness of breath.    Cardiovascular: Negative for chest pain.   Gastrointestinal: Negative for abdominal pain, constipation, diarrhea, nausea and vomiting.   Genitourinary: Negative for urinary incontinence.   Musculoskeletal: Positive for back pain and neck pain. Negative for arthralgias, joint swelling and myalgias.   Neurological: Negative for dizziness, weakness, numbness and headache.   Psychiatric/Behavioral: Positive for sleep disturbance.       Objective   Physical Exam   Constitutional: She is oriented to person, place, and time. She appears well-developed and well-nourished.   HENT:   Head: Normocephalic and atraumatic.   Eyes: Pupils are equal, round, and reactive to light.   Neck:   Decreased AROM   Cardiovascular: Normal rate, regular rhythm and normal heart  sounds.   Pulmonary/Chest: Breath sounds normal.   Abdominal: Soft. Bowel sounds are normal. She exhibits no distension. There is no abdominal tenderness.   Neurological: She is alert and oriented to person, place, and time. She has normal reflexes. She displays normal reflexes. No sensory deficit.   (-) Tinels, (+) Phalens on left   Psychiatric: Her behavior is normal. Thought content normal.         Assessment/Plan   Diagnoses and all orders for this visit:    1. Chronic bilateral low back pain with left-sided sciatica (Primary)    2. Neck pain    3. Cervical radiculopathy    4. Lumbar radiculopathy    5. Other long term (current) drug therapy        Inspect reviewed, in order. Last UDS 7/27/20.  Continue oxycodone 10mg q8h prn, denies side effects.  Could not tolerate Gabapentin with severe somnolence.  Also takes OTC ibuprofen prn.  Discussed wearing neutral wrist brace on left at night for probable early CTS.  RTC 3 months for f/u.

## 2021-05-10 ENCOUNTER — TELEPHONE (OUTPATIENT)
Dept: PAIN MEDICINE | Facility: HOSPITAL | Age: 31
End: 2021-05-10

## 2021-05-10 NOTE — TELEPHONE ENCOUNTER
That is a serious violation of her opioid agreement, we will need to discharge her from the clinic. Thanks.

## 2021-09-02 ENCOUNTER — OFFICE VISIT (OUTPATIENT)
Dept: CARDIOLOGY | Facility: CLINIC | Age: 31
End: 2021-09-02

## 2021-09-02 VITALS
DIASTOLIC BLOOD PRESSURE: 85 MMHG | SYSTOLIC BLOOD PRESSURE: 130 MMHG | HEIGHT: 65 IN | OXYGEN SATURATION: 98 % | WEIGHT: 273 LBS | HEART RATE: 97 BPM | BODY MASS INDEX: 45.48 KG/M2

## 2021-09-02 DIAGNOSIS — Z34.90 PREGNANCY, UNSPECIFIED GESTATIONAL AGE: ICD-10-CM

## 2021-09-02 DIAGNOSIS — I10 ESSENTIAL HYPERTENSION: Primary | ICD-10-CM

## 2021-09-02 PROCEDURE — 93000 ELECTROCARDIOGRAM COMPLETE: CPT | Performed by: INTERNAL MEDICINE

## 2021-09-02 PROCEDURE — 99204 OFFICE O/P NEW MOD 45 MIN: CPT | Performed by: INTERNAL MEDICINE

## 2021-09-02 RX ORDER — LEVOTHYROXINE SODIUM 0.03 MG/1
25 TABLET ORAL DAILY
COMMUNITY

## 2021-09-02 RX ORDER — ONDANSETRON 4 MG/1
4 TABLET, FILM COATED ORAL EVERY 8 HOURS PRN
COMMUNITY

## 2021-09-02 RX ORDER — DOXYLAMINE SUCCINATE AND PYRIDOXINE HYDROCHLORIDE, DELAYED RELEASE TABLETS 10 MG/10 MG 10; 10 MG/1; MG/1
2 TABLET, DELAYED RELEASE ORAL NIGHTLY PRN
COMMUNITY

## 2021-09-02 RX ORDER — LEVOFLOXACIN 250 MG/1
250 TABLET ORAL DAILY
COMMUNITY

## 2021-09-02 RX ORDER — ASPIRIN 81 MG/1
81 TABLET ORAL DAILY
COMMUNITY

## 2021-09-02 RX ORDER — LABETALOL 200 MG/1
200 TABLET, FILM COATED ORAL 2 TIMES DAILY
COMMUNITY

## 2021-09-02 NOTE — PROGRESS NOTES
Encounter Date:2021      Patient ID: Judy Cesar is a 31 y.o. female.    Chief Complaint:  Pregnancy  Hypertension      History of Present Illness  The patient is a pleasant 31-year-old white female who is here for evaluation of hypertension during pregnancy.  This is her third pregnancy and she is 18 weeks pregnant expected date of delivery 2021.  Patient has known history of hypertension and has been on losartan and has been switched over to labetalol 100 mg a day since she was pregnant.  Patient is asymptomatic.  Patient had preeclampsia with second pregnancy.    The patient has been without any chest discomfort ,shortness of breath, palpitations, dizziness or syncope.  Denies having any headache ,abdominal pain ,nausea, vomiting , diarrhea constipation, loss of weight or loss of appetite.  Denies having any excessive bruising ,hematuria or blood in the stool.    Review of all systems negative except as indicated.    Reviewed ROS.    Assessment and Plan     ]]]]]]]]]]]]]]]]]]]]  Impression  ===============  -Pregnancy-18 weeks-expected date of delivery 2021.  This is her third pregnancy.  Patient had preeclampsia with second pregnancy    -Hypertension hypothyroidism    -Status post  LEEP tonsillectomy    -Family history of heart disease and hypertension    -Former smoker    -No known allergies  ===========  Plan  =========  EKG showed normal sinus rhythm normal ECG.  Her blood pressure today is 130/85.  Her blood pressure log from her memory was reviewed.  Patient systolics running sometimes in the 140.  Increase labetalol 200 mg twice a day.  Medications were reviewed and updated.  Follow-up in the office in about 2 to 3 weeks with an echocardiogram.  Further plan will depend on patient's progress.  ]]]]]]]]]]]]]]]]]]           Diagnosis Plan   1. Essential hypertension  Adult Transthoracic Echo Complete W/ Cont if Necessary Per Protocol   2. Pregnancy, unspecified gestational age   Adult Transthoracic Echo Complete W/ Cont if Necessary Per Protocol   LAB RESULTS (LAST 7 DAYS)    CBC        BMP        CMP         BNP        TROPONIN        CoAg        Creatinine Clearance  CrCl cannot be calculated (No successful lab value found.).    ABG        Radiology  No radiology results for the last day                The following portions of the patient's history were reviewed and updated as appropriate: allergies, current medications, past family history, past medical history, past social history, past surgical history and problem list.    Review of Systems   Constitutional: Negative for malaise/fatigue.   Cardiovascular: Negative for chest pain, leg swelling, palpitations and syncope.   Respiratory: Negative for shortness of breath.    Skin: Negative for rash.   Gastrointestinal: Negative for nausea and vomiting.   Neurological: Negative for dizziness, light-headedness and numbness.   All other systems reviewed and are negative.        Current Outpatient Medications:   •  aspirin 81 MG EC tablet, Take 81 mg by mouth Daily., Disp: , Rfl:   •  CBD (cannabidiol) oral oil, Take  by mouth Daily., Disp: , Rfl:   •  doxylamine-pyridoxine (DICLEGIS) 10-10 MG tablet delayed-release EC tablet, Take 2 tablets by mouth At Night As Needed., Disp: , Rfl:   •  labetalol (NORMODYNE) 100 MG tablet, Take 100 mg by mouth 2 (Two) Times a Day., Disp: , Rfl:   •  levoFLOXacin (LEVAQUIN) 250 MG tablet, Take 250 mg by mouth Daily., Disp: , Rfl:   •  levothyroxine (SYNTHROID, LEVOTHROID) 25 MCG tablet, Take 25 mcg by mouth Daily., Disp: , Rfl:   •  ondansetron (ZOFRAN) 4 MG tablet, Take 4 mg by mouth Every 8 (Eight) Hours As Needed for Nausea or Vomiting., Disp: , Rfl:     No Known Allergies    Family History   Problem Relation Age of Onset   • Cancer Mother    • Heart disease Father    • Hypertension Father        Past Surgical History:   Procedure Laterality Date   •  SECTION      x2   • LEEP     • TONSILLECTOMY    "  • WISDOM TOOTH EXTRACTION         Past Medical History:   Diagnosis Date   • Back pain    • Depression    • Hip pain     right   • Low back pain    • Neck pain        Family History   Problem Relation Age of Onset   • Cancer Mother    • Heart disease Father    • Hypertension Father        Social History     Socioeconomic History   • Marital status:      Spouse name: Not on file   • Number of children: Not on file   • Years of education: Not on file   • Highest education level: Not on file   Tobacco Use   • Smoking status: Former Smoker     Packs/day: 1.00     Types: Cigarettes     Quit date: 2021     Years since quittin.4   • Smokeless tobacco: Never Used   Vaping Use   • Vaping Use: Some days   • Substances: Nicotine, Flavoring   • Devices: Pre-filled or refillable cartridge   Substance and Sexual Activity   • Alcohol use: No   • Drug use: No           ECG 12 Lead    Date/Time: 2021 2:22 PM  Performed by: Aldo Horn MD  Authorized by: Aldo Horn MD   Comparison: compared with previous ECG   Comparison to previous ECG: Normal sinus rhythm normal ECG normal axis normal intervals 92/min no ectopy no prior tracing for comparison.                Objective:       Physical Exam    /85 (BP Location: Left arm, Patient Position: Sitting, Cuff Size: Large Adult)   Pulse 97   Ht 165.1 cm (65\")   Wt 124 kg (273 lb)   SpO2 98%   BMI 45.43 kg/m²   The patient is alert, oriented and in no distress.    Vital signs as noted above.  Exogenous obesity (BMI 45)    Head and neck revealed no carotid bruits or jugular venous distension.  No thyromegaly or lymphadenopathy is present.    Lungs clear.  No wheezing.  Breath sounds are normal bilaterally.    Heart normal first and second heart sounds.  No murmur..  No pericardial rub is present.  No gallop is present.    Abdomen soft and nontender.  No organomegaly is present.    Extremities revealed good peripheral pulses without any pedal " edema.    Skin warm and dry.    Musculoskeletal system is grossly normal.    CNS grossly normal.    Reviewed and updated.

## 2021-09-03 ENCOUNTER — PATIENT ROUNDING (BHMG ONLY) (OUTPATIENT)
Dept: CARDIOLOGY | Facility: CLINIC | Age: 31
End: 2021-09-03

## 2021-09-03 NOTE — PROGRESS NOTES
September 3, 2021    Hello, may I speak with Judy Cesar?    My name is AMALIA      I am  with [facility] [place]  [address] [place] [id number].    Before we get started may I verify your date of birth? 1990    I am calling to officially welcome you to our practice and ask about your recent visit. Is this a good time to talk? yes    Tell me about your visit with us. What things went well?  THINGS WENT VERY WELL., DR ORDERED TESTING TO BE SAFE       We're always looking for ways to make our patients' experiences even better. Do you have recommendations on ways we may improve?  no    Overall were you satisfied with your first visit to our practice? yes       I appreciate you taking the time to speak with me today. Is there anything else I can do for you? no      Thank you, and have a great day.

## 2021-09-29 ENCOUNTER — OFFICE VISIT (OUTPATIENT)
Dept: CARDIOLOGY | Facility: CLINIC | Age: 31
End: 2021-09-29

## 2021-09-29 ENCOUNTER — HOSPITAL ENCOUNTER (OUTPATIENT)
Dept: CARDIOLOGY | Facility: HOSPITAL | Age: 31
Discharge: HOME | End: 2021-09-29
Admitting: INTERNAL MEDICINE

## 2021-09-29 VITALS
WEIGHT: 273 LBS | HEIGHT: 65 IN | BODY MASS INDEX: 45.48 KG/M2 | SYSTOLIC BLOOD PRESSURE: 133 MMHG | DIASTOLIC BLOOD PRESSURE: 65 MMHG

## 2021-09-29 VITALS
OXYGEN SATURATION: 98 % | SYSTOLIC BLOOD PRESSURE: 131 MMHG | DIASTOLIC BLOOD PRESSURE: 71 MMHG | HEIGHT: 65 IN | HEART RATE: 85 BPM | WEIGHT: 283 LBS | BODY MASS INDEX: 47.15 KG/M2

## 2021-09-29 DIAGNOSIS — I10 ESSENTIAL HYPERTENSION: Primary | ICD-10-CM

## 2021-09-29 DIAGNOSIS — Z34.90 PREGNANCY, UNSPECIFIED GESTATIONAL AGE: ICD-10-CM

## 2021-09-29 DIAGNOSIS — I10 ESSENTIAL HYPERTENSION: ICD-10-CM

## 2021-09-29 LAB
BH CV ECHO MEAS - AO MAX PG (FULL): 2.4 MMHG
BH CV ECHO MEAS - AO MAX PG: 8.5 MMHG
BH CV ECHO MEAS - AO MEAN PG (FULL): 1.1 MMHG
BH CV ECHO MEAS - AO MEAN PG: 4.6 MMHG
BH CV ECHO MEAS - AO ROOT AREA (BSA CORRECTED): 1.5
BH CV ECHO MEAS - AO ROOT AREA: 9.1 CM^2
BH CV ECHO MEAS - AO ROOT DIAM: 3.4 CM
BH CV ECHO MEAS - AO V2 MAX: 145.7 CM/SEC
BH CV ECHO MEAS - AO V2 MEAN: 102 CM/SEC
BH CV ECHO MEAS - AO V2 VTI: 25.9 CM
BH CV ECHO MEAS - ASC AORTA: 3.2 CM
BH CV ECHO MEAS - AVA(I,A): 3.5 CM^2
BH CV ECHO MEAS - AVA(I,D): 3.5 CM^2
BH CV ECHO MEAS - AVA(V,A): 3.2 CM^2
BH CV ECHO MEAS - AVA(V,D): 3.2 CM^2
BH CV ECHO MEAS - BSA(HAYCOCK): 2.5 M^2
BH CV ECHO MEAS - BSA: 2.3 M^2
BH CV ECHO MEAS - BZI_BMI: 46.1 KILOGRAMS/M^2
BH CV ECHO MEAS - BZI_METRIC_HEIGHT: 165.1 CM
BH CV ECHO MEAS - BZI_METRIC_WEIGHT: 125.6 KG
BH CV ECHO MEAS - EDV(CUBED): 82.6 ML
BH CV ECHO MEAS - EDV(MOD-SP2): 122.9 ML
BH CV ECHO MEAS - EDV(MOD-SP4): 123.7 ML
BH CV ECHO MEAS - EDV(TEICH): 85.6 ML
BH CV ECHO MEAS - EF(CUBED): 49.8 %
BH CV ECHO MEAS - EF(MOD-SP2): 74 %
BH CV ECHO MEAS - EF(MOD-SP4): 64.2 %
BH CV ECHO MEAS - EF(TEICH): 42.2 %
BH CV ECHO MEAS - ESV(CUBED): 41.4 ML
BH CV ECHO MEAS - ESV(MOD-SP2): 32 ML
BH CV ECHO MEAS - ESV(MOD-SP4): 44.3 ML
BH CV ECHO MEAS - ESV(TEICH): 49.5 ML
BH CV ECHO MEAS - FS: 20.5 %
BH CV ECHO MEAS - IVS/LVPW: 1.1
BH CV ECHO MEAS - IVSD: 1.1 CM
BH CV ECHO MEAS - LA DIMENSION: 3.9 CM
BH CV ECHO MEAS - LA/AO: 1.1
BH CV ECHO MEAS - LV DIASTOLIC VOL/BSA (35-75): 54.5 ML/M^2
BH CV ECHO MEAS - LV MASS(C)D: 161.2 GRAMS
BH CV ECHO MEAS - LV MASS(C)DI: 70.9 GRAMS/M^2
BH CV ECHO MEAS - LV MAX PG: 6.1 MMHG
BH CV ECHO MEAS - LV MEAN PG: 3.5 MMHG
BH CV ECHO MEAS - LV SYSTOLIC VOL/BSA (12-30): 19.5 ML/M^2
BH CV ECHO MEAS - LV V1 MAX: 123.2 CM/SEC
BH CV ECHO MEAS - LV V1 MEAN: 89.6 CM/SEC
BH CV ECHO MEAS - LV V1 VTI: 24 CM
BH CV ECHO MEAS - LVIDD: 4.4 CM
BH CV ECHO MEAS - LVIDS: 3.5 CM
BH CV ECHO MEAS - LVOT AREA: 3.8 CM^2
BH CV ECHO MEAS - LVOT DIAM: 2.2 CM
BH CV ECHO MEAS - LVPWD: 1 CM
BH CV ECHO MEAS - MV A MAX VEL: 117.9 CM/SEC
BH CV ECHO MEAS - MV DEC SLOPE: 839.7 CM/SEC^2
BH CV ECHO MEAS - MV DEC TIME: 0.15 SEC
BH CV ECHO MEAS - MV E MAX VEL: 127.4 CM/SEC
BH CV ECHO MEAS - MV E/A: 1.1
BH CV ECHO MEAS - MV MAX PG: 6.6 MMHG
BH CV ECHO MEAS - MV MEAN PG: 4.6 MMHG
BH CV ECHO MEAS - MV V2 MAX: 128.5 CM/SEC
BH CV ECHO MEAS - MV V2 MEAN: 105.3 CM/SEC
BH CV ECHO MEAS - MV V2 VTI: 21.6 CM
BH CV ECHO MEAS - MVA(VTI): 4.2 CM^2
BH CV ECHO MEAS - PA ACC TIME: 0.12 SEC
BH CV ECHO MEAS - PA PR(ACCEL): 26.1 MMHG
BH CV ECHO MEAS - PULM DIAS VEL: 71.9 CM/SEC
BH CV ECHO MEAS - PULM S/D: 0.78
BH CV ECHO MEAS - PULM SYS VEL: 56.3 CM/SEC
BH CV ECHO MEAS - RAP SYSTOLE: 3 MMHG
BH CV ECHO MEAS - RV MAX PG: 5.2 MMHG
BH CV ECHO MEAS - RV MEAN PG: 3.1 MMHG
BH CV ECHO MEAS - RV V1 MAX: 114.1 CM/SEC
BH CV ECHO MEAS - RV V1 MEAN: 85 CM/SEC
BH CV ECHO MEAS - RV V1 VTI: 21.5 CM
BH CV ECHO MEAS - RVDD: 3.7 CM
BH CV ECHO MEAS - RVSP: 19.6 MMHG
BH CV ECHO MEAS - SI(AO): 103.8 ML/M^2
BH CV ECHO MEAS - SI(CUBED): 18.1 ML/M^2
BH CV ECHO MEAS - SI(LVOT): 40.1 ML/M^2
BH CV ECHO MEAS - SI(MOD-SP2): 40 ML/M^2
BH CV ECHO MEAS - SI(MOD-SP4): 34.9 ML/M^2
BH CV ECHO MEAS - SI(TEICH): 15.9 ML/M^2
BH CV ECHO MEAS - SV(AO): 235.9 ML
BH CV ECHO MEAS - SV(CUBED): 41.1 ML
BH CV ECHO MEAS - SV(LVOT): 91.1 ML
BH CV ECHO MEAS - SV(MOD-SP2): 90.9 ML
BH CV ECHO MEAS - SV(MOD-SP4): 79.4 ML
BH CV ECHO MEAS - SV(TEICH): 36.1 ML
BH CV ECHO MEAS - TR MAX VEL: 201.7 CM/SEC
LV EF 2D ECHO EST: 60 %
MAXIMAL PREDICTED HEART RATE: 187 BPM
STRESS TARGET HR: 159 BPM

## 2021-09-29 PROCEDURE — 99214 OFFICE O/P EST MOD 30 MIN: CPT | Performed by: INTERNAL MEDICINE

## 2021-09-29 PROCEDURE — 93306 TTE W/DOPPLER COMPLETE: CPT | Performed by: INTERNAL MEDICINE

## 2021-09-29 PROCEDURE — 93306 TTE W/DOPPLER COMPLETE: CPT

## 2021-09-29 NOTE — PROGRESS NOTES
Encounter Date:2021  Last seen 2021      Patient ID: Judy Cesar is a 31 y.o. female.    Chief Complaint:  Pregnancy  Hypertension        History of Present Illness  Patient recently was seen with following history.  Reviewed and updated.  Patient is not taking labetalol 200 mg twice a day.    The patient is a pleasant 31-year-old white female who is here for evaluation of hypertension during pregnancy.  This is her third pregnancy and she is 18 weeks pregnant expected date of delivery 2021.  Patient has known history of hypertension and has been on losartan and has been switched over to labetalol 100 mg a day since she was pregnant.  Patient is asymptomatic.  Patient had preeclampsia with second pregnancy.     The patient has been without any chest discomfort ,shortness of breath, palpitations, dizziness or syncope.  Denies having any headache ,abdominal pain ,nausea, vomiting , diarrhea constipation, loss of weight or loss of appetite.  Denies having any excessive bruising ,hematuria or blood in the stool.     Review of all systems negative except as indicated.     Reviewed ROS.     Assessment and Plan      ]]]]]]]]]]]]]]]]]]]]  Impression  ===============  -Pregnancy-18 weeks-expected date of delivery 2021.  This is her third pregnancy.  Patient had preeclampsia with second pregnancy    Echocardiogram-normal 2021     -Hypertension hypothyroidism     -Status post  LEEP tonsillectomy     -Family history of heart disease and hypertension     -Former smoker     -No known allergies  ===========  Plan  =========  Echocardiogram-normal 2021.  Have discussed and educated patient regarding this.  Patient EKG showed normal sinus rhythm normal ECG.    Hypertension  Blood pressure 130/70.  Continue labetalol 200 mg twice a day    Medications were reviewed and updated.  Follow-up in the office in 2 months.  Further plan will depend on patient's  progress.  ]]]]]]]]]]]]]]]]]]                      Diagnosis Plan   1. Essential hypertension     2. Pregnancy, unspecified gestational age     LAB RESULTS (LAST 7 DAYS)    CBC        BMP        CMP         BNP        TROPONIN        CoAg        Creatinine Clearance  CrCl cannot be calculated (No successful lab value found.).    ABG        Radiology  No radiology results for the last day                The following portions of the patient's history were reviewed and updated as appropriate: allergies, current medications, past family history, past medical history, past social history, past surgical history and problem list.    Review of Systems   Constitutional: Negative for malaise/fatigue.   Cardiovascular: Negative for chest pain, leg swelling, palpitations and syncope.   Respiratory: Negative for shortness of breath.    Skin: Negative for rash.   Gastrointestinal: Negative for nausea and vomiting.   Neurological: Negative for dizziness, light-headedness and numbness.   All other systems reviewed and are negative.        Current Outpatient Medications:   •  aspirin 81 MG EC tablet, Take 81 mg by mouth Daily., Disp: , Rfl:   •  CBD (cannabidiol) oral oil, Take  by mouth Daily., Disp: , Rfl:   •  doxylamine-pyridoxine (DICLEGIS) 10-10 MG tablet delayed-release EC tablet, Take 2 tablets by mouth At Night As Needed., Disp: , Rfl:   •  labetalol (NORMODYNE) 200 MG tablet, Take 200 mg by mouth 2 (Two) Times a Day., Disp: , Rfl:   •  levoFLOXacin (LEVAQUIN) 250 MG tablet, Take 250 mg by mouth Daily., Disp: , Rfl:   •  levothyroxine (SYNTHROID, LEVOTHROID) 25 MCG tablet, Take 25 mcg by mouth Daily., Disp: , Rfl:   •  ondansetron (ZOFRAN) 4 MG tablet, Take 4 mg by mouth Every 8 (Eight) Hours As Needed for Nausea or Vomiting., Disp: , Rfl:     No Known Allergies    Family History   Problem Relation Age of Onset   • Cancer Mother    • Heart disease Father    • Hypertension Father        Past Surgical History:   Procedure  "Laterality Date   •  SECTION      x2   • LEEP     • TONSILLECTOMY     • WISDOM TOOTH EXTRACTION         Past Medical History:   Diagnosis Date   • Back pain    • Depression    • Hip pain     right   • Low back pain    • Neck pain        Family History   Problem Relation Age of Onset   • Cancer Mother    • Heart disease Father    • Hypertension Father        Social History     Socioeconomic History   • Marital status:      Spouse name: Not on file   • Number of children: Not on file   • Years of education: Not on file   • Highest education level: Not on file   Tobacco Use   • Smoking status: Former Smoker     Packs/day: 1.00     Types: Cigarettes     Quit date: 2021     Years since quittin.4   • Smokeless tobacco: Never Used   Vaping Use   • Vaping Use: Some days   • Substances: Nicotine, Flavoring   • Devices: Pre-filled or refillable cartridge   Substance and Sexual Activity   • Alcohol use: No   • Drug use: No   • Sexual activity: Defer         Procedures      Objective:       Physical Exam    /71 (BP Location: Right arm, Patient Position: Sitting, Cuff Size: Large Adult)   Pulse 85   Ht 165.1 cm (65\")   Wt 128 kg (283 lb)   SpO2 98%   BMI 47.09 kg/m²   The patient is alert, oriented and in no distress.    Vital signs as noted above.  Exogenous obesity (BMI 47)    Head and neck revealed no carotid bruits or jugular venous distension.  No thyromegaly or lymphadenopathy is present.    Lungs clear.  No wheezing.  Breath sounds are normal bilaterally.    Heart normal first and second heart sounds.  No murmur..  No pericardial rub is present.  No gallop is present.    Abdomen soft and nontender.  No organomegaly is present.    Extremities revealed good peripheral pulses without any pedal edema.    Skin warm and dry.    Musculoskeletal system is grossly normal.    CNS grossly normal.    Reviewed and unchanged from last visit.        "

## 2021-12-22 ENCOUNTER — OFFICE VISIT (OUTPATIENT)
Dept: CARDIOLOGY | Facility: CLINIC | Age: 31
End: 2021-12-22

## 2021-12-22 VITALS
BODY MASS INDEX: 47.4 KG/M2 | OXYGEN SATURATION: 96 % | SYSTOLIC BLOOD PRESSURE: 121 MMHG | HEART RATE: 86 BPM | DIASTOLIC BLOOD PRESSURE: 71 MMHG | HEIGHT: 65 IN | WEIGHT: 284.5 LBS

## 2021-12-22 DIAGNOSIS — I10 ESSENTIAL HYPERTENSION: Primary | ICD-10-CM

## 2021-12-22 DIAGNOSIS — Z34.90 PREGNANCY, UNSPECIFIED GESTATIONAL AGE: ICD-10-CM

## 2021-12-22 PROCEDURE — 99213 OFFICE O/P EST LOW 20 MIN: CPT | Performed by: INTERNAL MEDICINE

## 2021-12-22 RX ORDER — OXYCODONE HYDROCHLORIDE 10 MG/1
TABLET ORAL
COMMUNITY
End: 2021-12-22

## 2021-12-22 RX ORDER — BUPRENORPHINE HYDROCHLORIDE AND NALOXONE HYDROCHLORIDE DIHYDRATE 8; 2 MG/1; MG/1
TABLET SUBLINGUAL
COMMUNITY
Start: 2021-12-13

## 2021-12-22 RX ORDER — LORATADINE 10 MG
TABLET ORAL
COMMUNITY
Start: 2021-12-21

## 2021-12-22 RX ORDER — ONDANSETRON 4 MG/1
TABLET, ORALLY DISINTEGRATING ORAL
COMMUNITY
Start: 2021-12-03

## 2021-12-22 RX ORDER — ALBUTEROL SULFATE 90 UG/1
AEROSOL, METERED RESPIRATORY (INHALATION)
COMMUNITY

## 2021-12-22 RX ORDER — SODIUM FLUORIDE 5 MG/ML
PASTE, DENTIFRICE DENTAL
COMMUNITY

## 2021-12-22 RX ORDER — FERROUS SULFATE 325(65) MG
TABLET ORAL
COMMUNITY
Start: 2021-11-24

## 2021-12-22 NOTE — PROGRESS NOTES
Encounter Date:2021  Last seen 2021      Patient ID: Judy Cesar is a 31 y.o. female.    Chief Complaint:  Pregnancy  Hypertension        History of Present Illness  Since I have last seen, the patient has been without any chest discomfort ,shortness of breath, palpitations, dizziness or syncope.  Denies having any headache ,abdominal pain ,nausea, vomiting , diarrhea constipation, loss of weight or loss of appetite.  Denies having any excessive bruising ,hematuria or blood in the stool.    Review of all systems negative except as indicated.    Reviewed ROS.  Assessment and Plan      ]]]]]]]]]]]]]]]]]]]]  Impression  ===============  -Pregnancy-18 weeks-expected date of delivery 2021.  This is her third pregnancy.  Patient had preeclampsia with second pregnancy     Echocardiogram-normal 2021     -Hypertension hypothyroidism     -Status post  LEEP tonsillectomy     -Family history of heart disease and hypertension     -Former smoker     -No known allergies  ===========  Plan  =========  Pregnancy-34 weeks  Echocardiogram-normal 2021.  Recent EKG showed normal sinus rhythm normal ECG.     Hypertension-well-controlled  Blood pressure 120/70  Continue labetalol 200 mg twice a day     Medications were reviewed and updated.    Follow-up in the office in  3 months.    Further plan will depend on patient's progress.  ]]]]]]]]]]]]]]]]]]                              Diagnosis Plan   1. Essential hypertension     2. Pregnancy, unspecified gestational age     LAB RESULTS (LAST 7 DAYS)    CBC        BMP        CMP         BNP        TROPONIN        CoAg        Creatinine Clearance  CrCl cannot be calculated (No successful lab value found.).    ABG        Radiology  No radiology results for the last day                The following portions of the patient's history were reviewed and updated as appropriate: allergies, current medications, past family history, past medical history, past social  history, past surgical history and problem list.    Review of Systems   Constitutional: Negative for fever and malaise/fatigue.   HENT: Negative for ear pain and nosebleeds.    Eyes: Negative for blurred vision and double vision.   Cardiovascular: Negative for chest pain, dyspnea on exertion and palpitations.   Respiratory: Negative for cough and shortness of breath.    Skin: Negative for rash.   Musculoskeletal: Negative for joint pain.   Gastrointestinal: Negative for abdominal pain, nausea and vomiting.   Neurological: Negative for focal weakness and headaches.   Psychiatric/Behavioral: Negative for depression. The patient is not nervous/anxious.    All other systems reviewed and are negative.        Current Outpatient Medications:   •  albuterol sulfate  (90 Base) MCG/ACT inhaler, albuterol sulfate HFA 90 mcg/actuation aerosol inhaler  TAKE 2 PUFFS BY MOUTH EVERY 4 TO 6 HOURS AS NEEDED, Disp: , Rfl:   •  aspirin 81 MG EC tablet, Take 81 mg by mouth Daily., Disp: , Rfl:   •  buprenorphine-naloxone (SUBOXONE) 8-2 MG per SL tablet, , Disp: , Rfl:   •  doxylamine-pyridoxine (DICLEGIS) 10-10 MG tablet delayed-release EC tablet, Take 2 tablets by mouth At Night As Needed., Disp: , Rfl:   •  ferrous sulfate 325 (65 FE) MG tablet, , Disp: , Rfl:   •  labetalol (NORMODYNE) 200 MG tablet, Take 200 mg by mouth 2 (Two) Times a Day., Disp: , Rfl:   •  levoFLOXacin (LEVAQUIN) 250 MG tablet, Take 250 mg by mouth Daily., Disp: , Rfl:   •  levothyroxine (SYNTHROID, LEVOTHROID) 25 MCG tablet, Take 25 mcg by mouth Daily., Disp: , Rfl:   •  ondansetron (ZOFRAN) 4 MG tablet, Take 4 mg by mouth Every 8 (Eight) Hours As Needed for Nausea or Vomiting., Disp: , Rfl:   •  Prenatal Vit-Fe Fumarate-FA ([facility] Prenatal) 27-0.8 MG tablet, , Disp: , Rfl:   •  Sodium Fluoride (Denta 5000 Plus) 1.1 % cream, Denta 5000 Plus 1.1 % cream  APPLY 1 A SMALL AMOUNT TO TEETH TWICE A DAY AS DIRECTED, Disp: , Rfl:   •  CBD (cannabidiol) oral oil,  "Take  by mouth Daily., Disp: , Rfl:   •  ondansetron ODT (ZOFRAN-ODT) 4 MG disintegrating tablet, , Disp: , Rfl:     No Known Allergies    Family History   Problem Relation Age of Onset   • Cancer Mother    • Heart disease Father    • Hypertension Father        Past Surgical History:   Procedure Laterality Date   •  SECTION      x2   • LEEP     • TONSILLECTOMY     • WISDOM TOOTH EXTRACTION         Past Medical History:   Diagnosis Date   • Back pain    • Depression    • Hip pain     right   • Low back pain    • Neck pain        Family History   Problem Relation Age of Onset   • Cancer Mother    • Heart disease Father    • Hypertension Father        Social History     Socioeconomic History   • Marital status:    Tobacco Use   • Smoking status: Former Smoker     Packs/day: 1.00     Types: Cigarettes     Quit date: 2021     Years since quittin.7   • Smokeless tobacco: Never Used   Vaping Use   • Vaping Use: Some days   • Substances: Nicotine, Flavoring   • Devices: Pre-filled or refillable cartridge   Substance and Sexual Activity   • Alcohol use: No   • Drug use: No   • Sexual activity: Defer         Procedures      Objective:       Physical Exam    /71   Pulse 86   Ht 165.1 cm (65\")   Wt 129 kg (284 lb 8 oz)   SpO2 96%   BMI 47.34 kg/m²   The patient is alert, oriented and in no distress.    Vital signs as noted above.    Head and neck revealed no carotid bruits or jugular venous distension.  No thyromegaly or lymphadenopathy is present.    Lungs clear.  No wheezing.  Breath sounds are normal bilaterally.    Heart normal first and second heart sounds.  No murmur..  No pericardial rub is present.  No gallop is present.    Abdomen soft and nontender.  No organomegaly is present.  Pregnancy.    Extremities revealed good peripheral pulses without any pedal edema.    Skin warm and dry.    Musculoskeletal system is grossly normal.    CNS grossly normal.    Reviewed and unchanged from last " visit

## 2025-02-10 NOTE — TELEPHONE ENCOUNTER
Management per [facility]ro - overdue for appt. Patient is not taking Androgel and is advised to discuss c Uro.       UDS + for unprescribed fentanyl. Patient did not report any surgery.